# Patient Record
(demographics unavailable — no encounter records)

---

## 2025-02-03 NOTE — REASON FOR VISIT
[Follow Up] : a follow up visit [FreeTextEntry1] :  R fibular hemimelia, bilateral foot deformity. [Father] : father

## 2025-02-03 NOTE — PHYSICAL EXAM
[FreeTextEntry1] : Healthy appearing 4-year-old child. Awake, alert, in no acute distress. Pleasant and cooperative.  Eyes are clear with no sclera abnormalities. External ears, nose and mouth are clear.  Good respiratory effort with no audible wheezing without use of a stethoscope.  Right lower extremity shorter than left by approximately 5-6 cm The first ray of the right foot is intact, but there appears to be absence of lateral rays and lateral malloelus. There is soft tissue laterally with 2 lesser digit nubbins with nails, no evidence of bony development to these digits.  The right foot is in valgus, non functional.  Left lower extremity appears to have normal tibia and fibula present. There appears to be 2 lesser rays absent from foot.  Full range of motion of bilateral hips, wide symmetric abduction. Full range of motion bilateral knees.  WWP distally, brisk cap refill.

## 2025-02-03 NOTE — HISTORY OF PRESENT ILLNESS
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 He was born in [facility] and recently moved to the . He was born full term, normal pregnancy, CS delivery, with breech presentation. 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 Upon delivery, his bilateral foot deformity and RLE deformity were noted. During her initial evaluation in [facility], there were no hip concerns as per mother. The R leg is shorter than the left. No interventions were recommended initially [place] other country, and she was told a possible intervention would occur around when patient turned 4 with a limb lengthening procedure.  He saw my partner, Dr. Sergo Cook in May 2024 who recommended consideration for amputation with prosthesis.  He was initially seen on 08/23/2024 and recommended for external shoe lift from prothotics. Today father reports that he has not received his shoe lift. He is ambulating without assistance, and father reports that the left side appears fully functional, and he is bearing weight on the right side as well. No pain complaints. He is otherwise healthy and has not required any PT or other interventional services. [facility]re for further orthopedic recommendations.

## 2025-02-03 NOTE — REVIEW OF SYSTEMS
[Limping] : limping [Change in Activity] : no change in activity [Fever Above 102] : no fever [Malaise] : no malaise [Rash] : no rash [Itching] : no itching [Eye Pain] : no eye pain [Redness] : no redness [Nasal Stuffiness] : no nasal congestion [Heart Problems] : no heart problems [Murmur] : no murmur [Congestion] : no congestion [Vomiting] : no vomiting [Diarrhea] : no diarrhea [Constipation] : no constipation [Kidney Infection] : no kidney infection [Bladder Infection] : no bladder infection [Joint Pains] : no arthralgias [Sleep Disturbances] : ~T no sleep disturbances

## 2025-02-03 NOTE — ASSESSMENT
[FreeTextEntry1] : 4-year-old M with right fibular hemimelia and bilateral foot deformity/absence of rays  The history was obtained today from the child and parent; given the patient's age and/or the child's mental capacity, the history was unreliable and the parent was used as an independent historian.  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  There is also absence of the 2nd through 5th rays of the right foot.  He does not appear to have developed a calcaneal ossification.  There is a significant limb length discrepancy with the right side being approximately 5 to 6 cm shorter than the left -On the left side, he has full development of the tibia and fibula.  He has absence of 2 of the lesser rays.  This foot appears more functional. -Radiographs were obtained today in office of the left tibia/fibula. Radiographs confirm absence of the right fibula and lateral raise of the right foot.  The left foot has absence of to lesser rays. -We had a very long discussion with family today regarding his underlying condition.  Ultimately, given extent of involvement and shortening about the right lower extremity, my recommendation would be for below the knee amputation of the right extremity with goal to be fit for prosthesis.  I explained that a prosthesis would help improve quality of life.  Family inquired at length about potential lengthening options for the right lower extremity.  Given greater than 5 cm discrepancy at approximately 4 years of age, risk for progressive worsening of discrepancy is high at this time.  We discussed limits of surgical limb lengthening and need for recurrent procedures throughout the child's growing years with associated risks of pain, stiffness, infection, loss of limb function, etc. Family remains very averse to surgical intervention/amputation at this time and would like other options. - Recommendation for 2nd opinion about surgery with [facility]. Father was given address, email and phone number during todays visit. -More conservative options such as an external shoe lift to help accommodate the discrepancy in his limb lengths was discussed today.  Our representative from [employer] met with family today to discuss getting this process started.   -No activity restrictions at this time -We will plan to see him back after his visit and recommendation from [facility].   We will discuss treatment options again at that time if family is interested.   This plan was discussed with family and all questions and concerns were addressed today.  I, Francia Alexander , have acted as a scribe and documented the above for Dr. Breen.

## 2025-02-03 NOTE — DATA REVIEWED
[de-identified] : Leg length XRs confirm right fibula hemimelia with complete absence of the fibula on the right. There is a leg length inequality with right being significantly shorter than the left, contribution isolated from tibia. The hips appear well developed and well located.   Left Tibia 2 views confirm full tibia and fibular development.    My interpretation and review of images taken today, 08/23/2024, in office:  Leg length XRs confirm right fibula hemimelia with complete absence of the fibula on the right. There is a leg length inequality with right being signfificantly shorter than the left, contribution isolated from tibia. The hips appear well developed and well located.  Left Tibia 2 views confirm full tibia and fibular development.   May 2024 imaging in PACS by Dr. Sergo Cook: XR R tibia 2 views and R ankle 2 views: complete fibular hemimelia of the RLE, with one ray present in the foot. XR L foot 2 views: Tibia and fibula present, and appears normal in structure. L foot with only 2 rays present,.